# Patient Record
Sex: MALE | URBAN - METROPOLITAN AREA
[De-identification: names, ages, dates, MRNs, and addresses within clinical notes are randomized per-mention and may not be internally consistent; named-entity substitution may affect disease eponyms.]

---

## 2024-03-13 ENCOUNTER — LAB (OUTPATIENT)
Dept: URBAN - METROPOLITAN AREA CLINIC 111 | Facility: CLINIC | Age: 29
End: 2024-03-13

## 2024-03-13 ENCOUNTER — OV NP (OUTPATIENT)
Dept: URBAN - METROPOLITAN AREA CLINIC 111 | Facility: CLINIC | Age: 29
End: 2024-03-13
Payer: COMMERCIAL

## 2024-03-13 VITALS
TEMPERATURE: 98.1 F | DIASTOLIC BLOOD PRESSURE: 73 MMHG | SYSTOLIC BLOOD PRESSURE: 117 MMHG | HEIGHT: 72 IN | BODY MASS INDEX: 22.7 KG/M2 | WEIGHT: 167.6 LBS | HEART RATE: 73 BPM

## 2024-03-13 DIAGNOSIS — K62.5 RECTAL BLEEDING: ICD-10-CM

## 2024-03-13 DIAGNOSIS — R19.7 DIARRHEA, UNSPECIFIED TYPE: ICD-10-CM

## 2024-03-13 DIAGNOSIS — R10.30 LOWER ABDOMINAL PAIN: ICD-10-CM

## 2024-03-13 DIAGNOSIS — R63.4 WEIGHT LOSS: ICD-10-CM

## 2024-03-13 PROCEDURE — 99204 OFFICE O/P NEW MOD 45 MIN: CPT | Performed by: PHYSICIAN ASSISTANT

## 2024-03-13 NOTE — HPI-TODAY'S VISIT:
27 y/o male here with blood in the stool and diarrhea since 2/5. Went to [facility] ER 2/25. CT a/p with contrast with no acute findings. Soft tissue fullness in L periaortic region concerning for lymphadenopathy. Pt had unimpressive labs except for bili which was elevated at 2.4 and slight elevation of AST. Stool studies negative (C. diff & GI PCR). FIT test at ER positive. He saw Dr. Piedra after ER visit. Frustrated and wants second opinion as no further testing was ordered. He has tried Imodium, Pepto bismol, and Prednisone (sister's Rx who has Crohn's). He is getting up during the night to stool.  [facility] records reviewed and pt sent me detailed message summarized above prior to appt. Symptoms have not improved. Having 4-6 stools a day and a few more at night. Stool is loose and there is red blood in the stool each time. Baseline before diarrhea was two times a day. He first started having diarrhea and then a week later the bleeding started. He went to a [employer] game and had unlimited food before diarrhea started. He states bili has been high previously. Pt lost 10 lbs in the last month. Having mild to moderate abd pain in lower abdomen but gets worse at night. No N/V. Sister has Crohn's disease. Followed by Dr. Martino.  He reports increased stress with work and family. Dad passed away in December and mother passed away 5 years ago. Grandmother had stomach cancer. Dad had CLL and got sepsis. Mother had breast cancer. Unsure about polyps in parents or if they had colonoscopies. Regular use of marijuana. Occasional use of tobacco and ETOH.

## 2024-03-13 NOTE — PHYSICAL EXAM GASTROINTESTINAL
Abdomen, soft, mild tenderness in mid abd, nondistended, no guarding or rigidity, no masses palpable, normal bowel sounds, Liver and Spleen, no hepatosplenomegaly, Rectal, deferred

## 2024-03-18 ENCOUNTER — OV NP (OUTPATIENT)
Dept: URBAN - METROPOLITAN AREA CLINIC 12 | Facility: CLINIC | Age: 29
End: 2024-03-18

## 2024-03-21 ENCOUNTER — LAB (OUTPATIENT)
Dept: URBAN - METROPOLITAN AREA CLINIC 4 | Facility: CLINIC | Age: 29
End: 2024-03-21

## 2024-03-21 ENCOUNTER — COLON (OUTPATIENT)
Dept: URBAN - METROPOLITAN AREA SURGERY CENTER 15 | Facility: SURGERY CENTER | Age: 29
End: 2024-03-21

## 2024-04-05 ENCOUNTER — OV EP (OUTPATIENT)
Dept: URBAN - METROPOLITAN AREA CLINIC 111 | Facility: CLINIC | Age: 29
End: 2024-04-05
Payer: COMMERCIAL

## 2024-04-05 VITALS
HEIGHT: 72 IN | DIASTOLIC BLOOD PRESSURE: 78 MMHG | BODY MASS INDEX: 23.3 KG/M2 | TEMPERATURE: 98.1 F | HEART RATE: 76 BPM | WEIGHT: 172 LBS | SYSTOLIC BLOOD PRESSURE: 125 MMHG

## 2024-04-05 DIAGNOSIS — K50.111 CROHN'S DISEASE OF COLON WITH RECTAL BLEEDING: ICD-10-CM

## 2024-04-05 PROBLEM — 50440006: Status: ACTIVE | Noted: 2024-04-05

## 2024-04-05 PROCEDURE — 99214 OFFICE O/P EST MOD 30 MIN: CPT | Performed by: PHYSICIAN ASSISTANT

## 2024-04-05 RX ORDER — MESALAMINE 1.2 G/1
2 TABLETS WITH A MEAL TABLET, DELAYED RELEASE ORAL ONCE A DAY
Qty: 60 | Status: ACTIVE | COMMUNITY
Start: 2024-03-21 | End: 2024-04-20

## 2024-04-05 RX ORDER — PREDNISONE 20 MG/1
1 TABLET TABLET ORAL ONCE A DAY
Qty: 30 | Refills: 3 | Status: ACTIVE | COMMUNITY
Start: 2024-03-21 | End: 2024-07-19

## 2024-04-05 RX ORDER — ACETAMINOPHEN 650 MG
2 TABLETS AS NEEDED TABLET, EXTENDED RELEASE ORAL
Qty: 6 TABLET | Refills: 0 | OUTPATIENT
Start: 2024-04-05 | End: 2024-04-06

## 2024-04-05 RX ORDER — USTEKINUMAB 130 MG/26ML
AS DIRECTED SOLUTION INTRAVENOUS ONCE
OUTPATIENT
Start: 2024-04-05 | End: 2024-04-06

## 2024-04-05 RX ORDER — MESALAMINE 1.2 G/1
2 TABLETS WITH A MEAL TABLET, DELAYED RELEASE ORAL ONCE A DAY
Qty: 60 | Refills: 0
Start: 2024-03-21 | End: 2024-05-05

## 2024-04-05 RX ORDER — DIPHENHYDRAMINE HCL 2 %
1 CAPSULE AT BEDTIME AS NEEDED CREAM (GRAM) TOPICAL ONCE A DAY
Qty: 30 CAPSULE | Refills: 0 | OUTPATIENT
Start: 2024-04-05 | End: 2024-05-05

## 2024-04-05 NOTE — HPI-TODAY'S VISIT:
30 y/o male here to follow up after colonoscopy. Seen by me on 3/13 for diarrhea, rectal bleeding, weight loss, and lower abdominal pain. CT a/p unimpressive except for periaortal lymphadenopathy. Sister with Crohn's. S/p colonoscopy 3/21 by Dr. Parikh with pancolitis moderate in severity. Pt was started on Prednisone and Lialda. He got labs in preparation to start biologic for IBD. Path with patchy chronic active colitis c/w skip lesion of ulcerative colitis and diffuse chronic active colitis. D/t skip lesion diagnosis more c/w Crohn's. Stelara was recommended. Pt had labs. Negative Hep B & TB. Mildly low iron and ferritin. CRP up at 14. LFTs mildly elevated.  Pt is feeling better on Prednisone and Lialda. He is stooling less. Going about 3 times a day with less blood in the stool and stool is more formed. Rarely waking up at night to stool. Vitamin D last year was okay. In the past vitamin D was low.

## 2024-05-06 ENCOUNTER — OFFICE VISIT (OUTPATIENT)
Dept: URBAN - METROPOLITAN AREA CLINIC 97 | Facility: CLINIC | Age: 29
End: 2024-05-06
Payer: COMMERCIAL

## 2024-05-06 VITALS
HEART RATE: 74 BPM | BODY MASS INDEX: 24.24 KG/M2 | DIASTOLIC BLOOD PRESSURE: 83 MMHG | SYSTOLIC BLOOD PRESSURE: 145 MMHG | TEMPERATURE: 97 F | HEIGHT: 72 IN | RESPIRATION RATE: 18 BRPM | WEIGHT: 179 LBS

## 2024-05-06 DIAGNOSIS — K50.80 CROHN'S COLITIS: ICD-10-CM

## 2024-05-06 PROCEDURE — 96413 CHEMO IV INFUSION 1 HR: CPT | Performed by: INTERNAL MEDICINE

## 2024-05-06 RX ORDER — PREDNISONE 20 MG/1
1 TABLET TABLET ORAL ONCE A DAY
Qty: 30 | Refills: 3 | Status: ACTIVE | COMMUNITY
Start: 2024-03-21 | End: 2024-07-19

## 2024-05-06 RX ORDER — DIPHENHYDRAMINE HCL 2 %
1 CAPSULE AT BEDTIME AS NEEDED CREAM (GRAM) TOPICAL ONCE A DAY
Qty: 30 CAPSULE | Refills: 0 | Status: ACTIVE | COMMUNITY
Start: 2024-04-09 | End: 2024-05-09

## 2024-05-08 ENCOUNTER — WEB ENCOUNTER (OUTPATIENT)
Dept: URBAN - METROPOLITAN AREA CLINIC 111 | Facility: CLINIC | Age: 29
End: 2024-05-08

## 2024-05-09 ENCOUNTER — TELEPHONE ENCOUNTER (OUTPATIENT)
Dept: URBAN - METROPOLITAN AREA CLINIC 23 | Facility: CLINIC | Age: 29
End: 2024-05-09

## 2024-05-09 RX ORDER — USTEKINUMAB 90 MG/ML
PRE-FILLED SYRINGE INJECTION, SOLUTION SUBCUTANEOUS
OUTPATIENT
Start: 2024-05-09

## 2024-05-19 ENCOUNTER — WEB ENCOUNTER (OUTPATIENT)
Dept: URBAN - METROPOLITAN AREA CLINIC 111 | Facility: CLINIC | Age: 29
End: 2024-05-19

## 2024-05-30 ENCOUNTER — TELEPHONE ENCOUNTER (OUTPATIENT)
Dept: URBAN - METROPOLITAN AREA CLINIC 111 | Facility: CLINIC | Age: 29
End: 2024-05-30

## 2024-05-30 RX ORDER — USTEKINUMAB 90 MG/ML
INJECT 1 PEN INJECTION, SOLUTION SUBCUTANEOUS
Qty: 1 PEN NEEDLE | Refills: 6 | OUTPATIENT
Start: 2024-05-30 | End: 2025-07-24

## 2024-06-03 ENCOUNTER — WEB ENCOUNTER (OUTPATIENT)
Dept: URBAN - METROPOLITAN AREA CLINIC 111 | Facility: CLINIC | Age: 29
End: 2024-06-03

## 2024-06-04 ENCOUNTER — DASHBOARD ENCOUNTERS (OUTPATIENT)
Age: 29
End: 2024-06-04

## 2024-06-04 ENCOUNTER — OFFICE VISIT (OUTPATIENT)
Dept: URBAN - METROPOLITAN AREA CLINIC 23 | Facility: CLINIC | Age: 29
End: 2024-06-04
Payer: COMMERCIAL

## 2024-06-04 VITALS
SYSTOLIC BLOOD PRESSURE: 116 MMHG | HEART RATE: 79 BPM | TEMPERATURE: 98.4 F | DIASTOLIC BLOOD PRESSURE: 65 MMHG | WEIGHT: 176 LBS | BODY MASS INDEX: 23.84 KG/M2 | HEIGHT: 72 IN

## 2024-06-04 DIAGNOSIS — K50.80 CROHN'S COLITIS: ICD-10-CM

## 2024-06-04 DIAGNOSIS — K62.5 RECTAL BLEEDING: ICD-10-CM

## 2024-06-04 PROCEDURE — 99214 OFFICE O/P EST MOD 30 MIN: CPT | Performed by: INTERNAL MEDICINE

## 2024-06-04 RX ORDER — PREDNISONE 20 MG/1
1 TABLET TABLET ORAL ONCE A DAY
Qty: 30 | Refills: 3 | Status: ACTIVE | COMMUNITY
Start: 2024-03-21 | End: 2024-07-19

## 2024-06-04 RX ORDER — USTEKINUMAB 90 MG/ML
INJECT 1 PEN INJECTION, SOLUTION SUBCUTANEOUS
Qty: 1 PEN NEEDLE | Refills: 6 | Status: ACTIVE | COMMUNITY
Start: 2024-05-30 | End: 2025-07-24

## 2024-06-04 RX ORDER — MESALAMINE 1.2 G/1
TAKE 4 TABLETS (4.8 GRAM) BY ORAL ROUTE ONCE DAILY WITH A MEAL TABLET, DELAYED RELEASE ORAL ONCE A DAY
Qty: 120 | Refills: 3 | OUTPATIENT
Start: 2024-06-04 | End: 2024-10-02

## 2024-06-04 NOTE — HPI-TODAY'S VISIT:
28 y/o male presents today for follow-up after he started his Stelara infusion.  He had his first infusion a month ago, overall he feels better scheduled for his first injection next month.  He had recent travel which caused recent flare.  He attributed to food  He is currently under treatment for Crohn's colitis and has been feeling "pretty good" since starting Stelara approximately a month ago.  He is still on the that is taking steroids only during flare for 2 to 3 days. he is planning another trip and wishes to take Prednisone and Lialda as a precaution. He confirms that he has a refill of Prednisone at 20 milligrams and will be taking four pills of Lialda.  He had a colonoscopy in March 21, 2024 revealed pancolitis but he had skip lesion on the biopsy consistent with Crohn colitis.  His sister has Crohn disease.   .

## 2024-08-07 ENCOUNTER — LAB OUTSIDE AN ENCOUNTER (OUTPATIENT)
Dept: URBAN - METROPOLITAN AREA CLINIC 111 | Facility: CLINIC | Age: 29
End: 2024-08-07

## 2024-08-07 ENCOUNTER — OFFICE VISIT (OUTPATIENT)
Dept: URBAN - METROPOLITAN AREA CLINIC 111 | Facility: CLINIC | Age: 29
End: 2024-08-07
Payer: COMMERCIAL

## 2024-08-07 VITALS
TEMPERATURE: 98.1 F | HEART RATE: 67 BPM | SYSTOLIC BLOOD PRESSURE: 105 MMHG | HEIGHT: 72 IN | WEIGHT: 175 LBS | BODY MASS INDEX: 23.7 KG/M2 | DIASTOLIC BLOOD PRESSURE: 66 MMHG

## 2024-08-07 DIAGNOSIS — R59.1 LYMPHADENOPATHY: ICD-10-CM

## 2024-08-07 DIAGNOSIS — R93.89 ABNORMAL CT SCAN: ICD-10-CM

## 2024-08-07 DIAGNOSIS — K50.80 CROHN'S COLITIS: ICD-10-CM

## 2024-08-07 PROBLEM — 129679001: Status: ACTIVE | Noted: 2024-08-07

## 2024-08-07 PROCEDURE — 99214 OFFICE O/P EST MOD 30 MIN: CPT | Performed by: INTERNAL MEDICINE

## 2024-08-07 RX ORDER — MESALAMINE 1.2 G/1
TAKE 4 TABLETS (4.8 GRAM) BY ORAL ROUTE ONCE DAILY WITH A MEAL TABLET, DELAYED RELEASE ORAL ONCE A DAY
Qty: 120 | Refills: 3 | Status: ON HOLD | COMMUNITY
Start: 2024-06-04 | End: 2024-10-02

## 2024-08-07 RX ORDER — USTEKINUMAB 90 MG/ML
INJECT 1 PEN INJECTION, SOLUTION SUBCUTANEOUS
Qty: 1 PEN NEEDLE | Refills: 6 | Status: ACTIVE | COMMUNITY
Start: 2024-05-30 | End: 2025-07-24

## 2024-08-07 NOTE — HPI-TODAY'S VISIT:
28 y/o male presents today for follow-up after he was started on Stelara for inflammatory bowel disease.   - Feeling much better   - Improvement since starting treatment (Lealda and steroids)   - BMs: 4-5/day, still frequent   - Occasional blood in stool, decreased   - Stomach still sensitive to food   - New onset exercise-induced asthma (previously only triggered by smoking) He is off  steroids and Lialda    - Previous CT (25/02/2024): Soft tissue fullness in left periaortic area (small lymph node)   - Previous blood tests (May): High inflammatory markers, low iron   He has  Crohn's colitis  He had a colonoscopy in March 21, 2024 revealed pancolitis but he had skip lesion on the biopsy consistent with Crohn colitis.  His sister has Crohn disease.   .

## 2024-08-13 ENCOUNTER — LAB OUTSIDE AN ENCOUNTER (OUTPATIENT)
Dept: URBAN - METROPOLITAN AREA CLINIC 23 | Facility: CLINIC | Age: 29
End: 2024-08-13

## 2024-08-20 ENCOUNTER — WEB ENCOUNTER (OUTPATIENT)
Dept: URBAN - METROPOLITAN AREA CLINIC 111 | Facility: CLINIC | Age: 29
End: 2024-08-20

## 2024-08-20 LAB
A/G RATIO: 1.6
ABSOLUTE BASOPHILS: 37
ABSOLUTE EOSINOPHILS: 143
ABSOLUTE LYMPHOCYTES: 3007
ABSOLUTE MONOCYTES: 484
ABSOLUTE NEUTROPHILS: 2530
ALBUMIN: 4.6
ALKALINE PHOSPHATASE: 62
ALT (SGPT): 26
AST (SGOT): 26
BASOPHILS: 0.6
BILIRUBIN, TOTAL: 1.3
BUN/CREATININE RATIO: (no result)
BUN: 13
C-REACTIVE PROTEIN, QUANT: <3
CALCIUM: 9.7
CARBON DIOXIDE, TOTAL: 27
CHLORIDE: 99
CREATININE: 0.86
EGFR: 120
EOSINOPHILS: 2.3
FERRITIN, SERUM: 4
GLOBULIN, TOTAL: 2.9
GLUCOSE: 88
HEMATOCRIT: 36.2
HEMOGLOBIN: 11.9
IRON BIND.CAP.(TIBC): 493
IRON SATURATION: 6
IRON: 29
LYMPHOCYTES: 48.5
MCH: 24.5
MCHC: 32.9
MCV: 74.6
MONOCYTES: 7.8
MPV: 10.3
NEUTROPHILS: 40.8
PLATELET COUNT: 332
POTASSIUM: 4.2
PROTEIN, TOTAL: 7.5
RDW: 16.7
RED BLOOD CELL COUNT: 4.85
SODIUM: 134
VITAMIN B12: 669
VITAMIN D,25-OH,TOTAL,IA: 25
WHITE BLOOD CELL COUNT: 6.2

## 2024-08-23 ENCOUNTER — LAB OUTSIDE AN ENCOUNTER (OUTPATIENT)
Dept: URBAN - METROPOLITAN AREA CLINIC 23 | Facility: CLINIC | Age: 29
End: 2024-08-23

## 2024-08-26 ENCOUNTER — WEB ENCOUNTER (OUTPATIENT)
Dept: URBAN - METROPOLITAN AREA CLINIC 111 | Facility: CLINIC | Age: 29
End: 2024-08-26

## 2024-08-26 ENCOUNTER — WEB ENCOUNTER (OUTPATIENT)
Dept: URBAN - METROPOLITAN AREA CLINIC 23 | Facility: CLINIC | Age: 29
End: 2024-08-26

## 2024-10-16 ENCOUNTER — WEB ENCOUNTER (OUTPATIENT)
Dept: URBAN - METROPOLITAN AREA CLINIC 111 | Facility: CLINIC | Age: 29
End: 2024-10-16

## 2024-10-17 ENCOUNTER — WEB ENCOUNTER (OUTPATIENT)
Dept: URBAN - METROPOLITAN AREA CLINIC 111 | Facility: CLINIC | Age: 29
End: 2024-10-17

## 2024-11-26 ENCOUNTER — TELEPHONE ENCOUNTER (OUTPATIENT)
Dept: URBAN - METROPOLITAN AREA CLINIC 6 | Facility: CLINIC | Age: 29
End: 2024-11-26

## 2024-12-03 ENCOUNTER — WEB ENCOUNTER (OUTPATIENT)
Dept: URBAN - METROPOLITAN AREA CLINIC 111 | Facility: CLINIC | Age: 29
End: 2024-12-03

## 2024-12-05 ENCOUNTER — CLAIMS CREATED FROM THE CLAIM WINDOW (OUTPATIENT)
Dept: URBAN - METROPOLITAN AREA SURGERY CENTER 15 | Facility: SURGERY CENTER | Age: 29
End: 2024-12-05
Payer: COMMERCIAL

## 2024-12-05 ENCOUNTER — CLAIMS CREATED FROM THE CLAIM WINDOW (OUTPATIENT)
Dept: URBAN - METROPOLITAN AREA CLINIC 4 | Facility: CLINIC | Age: 29
End: 2024-12-05
Payer: COMMERCIAL

## 2024-12-05 DIAGNOSIS — K50.819 CROHN'S DISEASE OF SMALL AND LARGE INTESTINES WITH COMPLICATION: ICD-10-CM

## 2024-12-05 DIAGNOSIS — K50.10 CC (CROHN'S COLITIS): ICD-10-CM

## 2024-12-05 DIAGNOSIS — K63.89 OTHER SPECIFIED DISEASES OF INTESTINE: ICD-10-CM

## 2024-12-05 DIAGNOSIS — K50.119 CROHN'S DISEASE OF LARGE INTESTINE WITH UNSPECIFIED COMPLICATIONS: ICD-10-CM

## 2024-12-05 PROCEDURE — 88342 IMHCHEM/IMCYTCHM 1ST ANTB: CPT | Performed by: PATHOLOGY

## 2024-12-05 PROCEDURE — 45380 COLONOSCOPY AND BIOPSY: CPT | Performed by: INTERNAL MEDICINE

## 2024-12-05 PROCEDURE — 00811 ANES LWR INTST NDSC NOS: CPT | Performed by: NURSE ANESTHETIST, CERTIFIED REGISTERED

## 2024-12-05 PROCEDURE — 88305 TISSUE EXAM BY PATHOLOGIST: CPT | Performed by: PATHOLOGY

## 2024-12-05 RX ORDER — USTEKINUMAB 90 MG/ML
INJECT 1 PEN INJECTION, SOLUTION SUBCUTANEOUS
Qty: 1 PEN NEEDLE | Refills: 6 | Status: ACTIVE | COMMUNITY
Start: 2024-05-30 | End: 2025-07-24

## 2024-12-09 ENCOUNTER — LAB OUTSIDE AN ENCOUNTER (OUTPATIENT)
Dept: URBAN - METROPOLITAN AREA CLINIC 111 | Facility: CLINIC | Age: 29
End: 2024-12-09

## 2024-12-19 ENCOUNTER — OFFICE VISIT (OUTPATIENT)
Dept: URBAN - METROPOLITAN AREA CLINIC 111 | Facility: CLINIC | Age: 29
End: 2024-12-19

## 2024-12-23 ENCOUNTER — OFFICE VISIT (OUTPATIENT)
Dept: URBAN - METROPOLITAN AREA CLINIC 111 | Facility: CLINIC | Age: 29
End: 2024-12-23
Payer: COMMERCIAL

## 2024-12-23 VITALS
HEART RATE: 39 BPM | DIASTOLIC BLOOD PRESSURE: 71 MMHG | TEMPERATURE: 98.1 F | SYSTOLIC BLOOD PRESSURE: 118 MMHG | WEIGHT: 181 LBS | HEIGHT: 72 IN | BODY MASS INDEX: 24.52 KG/M2

## 2024-12-23 DIAGNOSIS — D64.9 MILD ANEMIA: ICD-10-CM

## 2024-12-23 DIAGNOSIS — K50.80 CROHN'S COLITIS: ICD-10-CM

## 2024-12-23 DIAGNOSIS — R79.89 LOW VITAMIN D LEVEL: ICD-10-CM

## 2024-12-23 PROCEDURE — 99214 OFFICE O/P EST MOD 30 MIN: CPT | Performed by: PHYSICIAN ASSISTANT

## 2024-12-23 RX ORDER — USTEKINUMAB 90 MG/ML
INJECT 1 PEN INJECTION, SOLUTION SUBCUTANEOUS
Qty: 1 PEN NEEDLE | Refills: 6 | Status: ACTIVE | COMMUNITY
Start: 2024-05-30 | End: 2025-07-24

## 2024-12-23 RX ORDER — DICYCLOMINE HYDROCHLORIDE 20 MG/1
1 TABLET TABLET ORAL THREE TIMES A DAY
Qty: 90 | Refills: 0 | OUTPATIENT
Start: 2024-12-23 | End: 2025-01-22

## 2024-12-23 NOTE — HPI-TODAY'S VISIT:
30 y/o male with Crohn's here to follow up after colonoscopy. Last seen by Dr. Parikh in August. Pt was doing much better after starting treatment. He was off steroids and Lialda and taking Stelara. Colon in 3/2024 with pancolitis and skip lesion. Pt had labs ordered and colonoscopy ordered for December which would be 6 months after starting Stelara. He had periaortic lymph node on prior CT so he had repeat CT a/p also ordered. Pt was found to have mild anemia and low iron as well as low vitamin D. He was told to take OTC iron supplement qd and 2000 IU of vitamin D qd for 2 months. CRP had normalized. CT in August showed unchanged left periaortic soft tissue fullness that he will not need further work up for. Pt reached out in October about having a flare. He started taking Lialda and Prednisone for a few days and was told to follow up if symptoms did not improve. S/p colon on 12/5 revealing Crohn's with mild inflammation in the colon compared to prior. Path with patchy chronic active colitis.  Pt is doing well. Flare improved. He notices flares when he travels and has dietary changes. He is trying to avoid alcohol which is a trigger for him. He has been taking Stelara every 8 weeks. He has been taking vitamin D and iron supplement. He reports he has been getting a lot of abdominal cramps after abdominal work-outs. He is only getting cramps after working out.

## 2025-04-07 ENCOUNTER — WEB ENCOUNTER (OUTPATIENT)
Dept: URBAN - METROPOLITAN AREA CLINIC 111 | Facility: CLINIC | Age: 30
End: 2025-04-07

## 2025-04-10 ENCOUNTER — WEB ENCOUNTER (OUTPATIENT)
Dept: URBAN - METROPOLITAN AREA CLINIC 111 | Facility: CLINIC | Age: 30
End: 2025-04-10

## 2025-06-20 ENCOUNTER — WEB ENCOUNTER (OUTPATIENT)
Dept: URBAN - METROPOLITAN AREA CLINIC 111 | Facility: CLINIC | Age: 30
End: 2025-06-20

## 2025-06-20 RX ORDER — USTEKINUMAB 90 MG/ML
INJECT 1 PEN INJECTION, SOLUTION SUBCUTANEOUS
Qty: 1 PEN NEEDLE | Refills: 6
Start: 2024-05-30 | End: 2026-08-17

## 2025-06-24 ENCOUNTER — WEB ENCOUNTER (OUTPATIENT)
Dept: URBAN - METROPOLITAN AREA CLINIC 111 | Facility: CLINIC | Age: 30
End: 2025-06-24

## 2025-07-28 ENCOUNTER — WEB ENCOUNTER (OUTPATIENT)
Dept: URBAN - METROPOLITAN AREA CLINIC 111 | Facility: CLINIC | Age: 30
End: 2025-07-28

## 2025-07-28 RX ORDER — MESALAMINE 1.2 G/1
TAKE 4 TABLETS (4.8 GRAM) BY ORAL ROUTE ONCE DAILY WITH A MEAL TABLET, DELAYED RELEASE ORAL ONCE A DAY
Qty: 120 | Refills: 3
Start: 2024-06-04

## 2025-07-28 RX ORDER — PREDNISONE 20 MG/1
1 TABLET TABLET ORAL ONCE A DAY
Qty: 30 | Refills: 3
Start: 2024-03-21

## 2025-07-29 ENCOUNTER — WEB ENCOUNTER (OUTPATIENT)
Dept: URBAN - METROPOLITAN AREA CLINIC 111 | Facility: CLINIC | Age: 30
End: 2025-07-29

## 2025-07-29 RX ORDER — MESALAMINE 1.2 G/1
TAKE 4 TABLETS (4.8 GRAM) BY ORAL ROUTE ONCE DAILY WITH A MEAL TABLET, DELAYED RELEASE ORAL ONCE A DAY
Qty: 360 | Refills: 3
Start: 2024-06-04

## 2025-08-06 ENCOUNTER — WEB ENCOUNTER (OUTPATIENT)
Dept: URBAN - METROPOLITAN AREA CLINIC 111 | Facility: CLINIC | Age: 30
End: 2025-08-06

## 2025-08-06 RX ORDER — MESALAMINE 1.2 G/1
TAKE 4 TABLETS (4.8 GRAM) BY ORAL ROUTE ONCE DAILY WITH A MEAL TABLET, DELAYED RELEASE ORAL ONCE A DAY
Qty: 60 TABLET | Refills: 5
Start: 2024-06-04

## 2025-08-27 ENCOUNTER — OFFICE VISIT (OUTPATIENT)
Dept: URBAN - METROPOLITAN AREA CLINIC 111 | Facility: CLINIC | Age: 30
End: 2025-08-27
Payer: COMMERCIAL

## 2025-08-27 DIAGNOSIS — R19.7 DIARRHEA: ICD-10-CM

## 2025-08-27 DIAGNOSIS — K50.80 CROHN'S COLITIS: ICD-10-CM

## 2025-08-27 DIAGNOSIS — K62.5 RECTAL BLEEDING: ICD-10-CM

## 2025-08-27 PROCEDURE — 99214 OFFICE O/P EST MOD 30 MIN: CPT | Performed by: PHYSICIAN ASSISTANT

## 2025-08-27 RX ORDER — USTEKINUMAB 90 MG/ML
INJECT 1 PEN INJECTION, SOLUTION SUBCUTANEOUS
Qty: 1 PEN NEEDLE | Refills: 6 | Status: ACTIVE | COMMUNITY
Start: 2024-05-30 | End: 2026-08-17

## 2025-08-27 RX ORDER — PREDNISONE 20 MG/1
1 TABLET TABLET ORAL ONCE A DAY
Qty: 30 | Refills: 3 | Status: ACTIVE | COMMUNITY
Start: 2024-03-21

## 2025-08-27 RX ORDER — MESALAMINE 1.2 G/1
TAKE 4 TABLETS (4.8 GRAM) BY ORAL ROUTE ONCE DAILY WITH A MEAL TABLET, DELAYED RELEASE ORAL ONCE A DAY
Qty: 60 TABLET | Refills: 5 | Status: ACTIVE | COMMUNITY
Start: 2024-06-04

## 2025-08-28 ENCOUNTER — WEB ENCOUNTER (OUTPATIENT)
Dept: URBAN - METROPOLITAN AREA CLINIC 111 | Facility: CLINIC | Age: 30
End: 2025-08-28